# Patient Record
Sex: MALE | Race: WHITE | NOT HISPANIC OR LATINO | URBAN - METROPOLITAN AREA
[De-identification: names, ages, dates, MRNs, and addresses within clinical notes are randomized per-mention and may not be internally consistent; named-entity substitution may affect disease eponyms.]

---

## 2021-08-18 ENCOUNTER — WEB ENCOUNTER (OUTPATIENT)
Dept: URBAN - METROPOLITAN AREA CLINIC 94 | Facility: CLINIC | Age: 21
End: 2021-08-18

## 2021-08-18 ENCOUNTER — LAB OUTSIDE AN ENCOUNTER (OUTPATIENT)
Dept: URBAN - METROPOLITAN AREA CLINIC 94 | Facility: CLINIC | Age: 21
End: 2021-08-18

## 2021-08-18 ENCOUNTER — OFFICE VISIT (OUTPATIENT)
Dept: URBAN - METROPOLITAN AREA CLINIC 94 | Facility: CLINIC | Age: 21
End: 2021-08-18
Payer: COMMERCIAL

## 2021-08-18 VITALS
SYSTOLIC BLOOD PRESSURE: 115 MMHG | DIASTOLIC BLOOD PRESSURE: 73 MMHG | HEART RATE: 51 BPM | HEIGHT: 74 IN | TEMPERATURE: 98.4 F | WEIGHT: 194 LBS | BODY MASS INDEX: 24.9 KG/M2

## 2021-08-18 DIAGNOSIS — R94.5 ABNORMAL LFTS: ICD-10-CM

## 2021-08-18 DIAGNOSIS — R10.13 EPIGASTRIC PAIN: ICD-10-CM

## 2021-08-18 PROCEDURE — 99204 OFFICE O/P NEW MOD 45 MIN: CPT | Performed by: INTERNAL MEDICINE

## 2021-08-18 NOTE — HPI-TODAY'S VISIT:
21 yr male presents for evaluation of abdominal pain. Pt c/o epigastric abdominal pain wiht nausea for several months. Pain is dull and gets worse with food. Nausea and intermittent diarrhea. Pt evaluated by NP at [facility] 8/6/21. H Pylori breath test was negative. Labs including LFTs were WNL at that time. Celiac panel was negative. Pt subsequently evaluated at [facility] ER on 8/14/21 for abdominal pain after he drank unusually large amount of wine. Labs 8/14 showed  and ALT 88. Bilirubin and alkphos were WNL.  RUQ US was WNL. Pt was prescribed dicyclomine, prilosec and tramadol by ER physican.

## 2021-08-19 ENCOUNTER — LAB OUTSIDE AN ENCOUNTER (OUTPATIENT)
Dept: URBAN - METROPOLITAN AREA CLINIC 92 | Facility: CLINIC | Age: 21
End: 2021-08-19

## 2021-08-19 ENCOUNTER — TELEPHONE ENCOUNTER (OUTPATIENT)
Dept: URBAN - METROPOLITAN AREA CLINIC 92 | Facility: CLINIC | Age: 21
End: 2021-08-19

## 2021-08-19 PROBLEM — 166603001 LIVER FUNCTION TESTS ABNORMAL: Status: ACTIVE | Noted: 2021-08-19

## 2021-08-19 PROBLEM — 79922009 EPIGASTRIC PAIN: Status: ACTIVE | Noted: 2021-08-19

## 2021-08-19 LAB
A/G RATIO: 2.4
ALBUMIN: 4.7
ALKALINE PHOSPHATASE: 85
ALT (SGPT): 60
AST (SGOT): 60
BASO (ABSOLUTE): 0
BASOS: 0
BILIRUBIN, TOTAL: 2
BUN/CREATININE RATIO: 11
BUN: 10
CALCIUM: 9.1
CARBON DIOXIDE, TOTAL: 26
CHLORIDE: 102
CREATININE: 0.87
EGFR IF AFRICN AM: 142
EGFR IF NONAFRICN AM: 123
EOS (ABSOLUTE): 0.1
EOS: 3
GLOBULIN, TOTAL: 2
GLUCOSE: 89
HBSAG SCREEN: NEGATIVE
HEMATOCRIT: 47.4
HEMATOLOGY COMMENTS:: (no result)
HEMOGLOBIN: 15.8
HEP A AB, IGM: NEGATIVE
HEP B CORE AB, IGM: NEGATIVE
HEP C VIRUS AB: <0.1
IMMATURE CELLS: (no result)
IMMATURE GRANS (ABS): 0
IMMATURE GRANULOCYTES: 1
LIPASE: 14
LYMPHS (ABSOLUTE): 1.2
LYMPHS: 29
MCH: 29.7
MCHC: 33.3
MCV: 89
MONOCYTES(ABSOLUTE): 0.3
MONOCYTES: 7
NEUTROPHILS (ABSOLUTE): 2.5
NEUTROPHILS: 60
NRBC: (no result)
PLATELETS: 280
POTASSIUM: 4.5
PROTEIN, TOTAL: 6.7
RBC: 5.32
RDW: 12.2
SODIUM: 141
WBC: 4

## 2021-08-30 ENCOUNTER — WEB ENCOUNTER (OUTPATIENT)
Dept: URBAN - METROPOLITAN AREA CLINIC 94 | Facility: CLINIC | Age: 21
End: 2021-08-30

## 2021-09-13 ENCOUNTER — OFFICE VISIT (OUTPATIENT)
Dept: URBAN - METROPOLITAN AREA SURGERY CENTER 17 | Facility: SURGERY CENTER | Age: 21
End: 2021-09-13
Payer: COMMERCIAL

## 2021-09-13 ENCOUNTER — CLAIMS CREATED FROM THE CLAIM WINDOW (OUTPATIENT)
Dept: URBAN - METROPOLITAN AREA CLINIC 4 | Facility: CLINIC | Age: 21
End: 2021-09-13
Payer: COMMERCIAL

## 2021-09-13 DIAGNOSIS — K29.81 DUODENITIS WITH BLEEDING: ICD-10-CM

## 2021-09-13 DIAGNOSIS — R10.13 ABDOMINAL DISCOMFORT, EPIGASTRIC: ICD-10-CM

## 2021-09-13 DIAGNOSIS — K29.80 ACUTE DUODENITIS: ICD-10-CM

## 2021-09-13 DIAGNOSIS — K31.89 ACQUIRED DEFORMITY OF DUODENUM: ICD-10-CM

## 2021-09-13 DIAGNOSIS — K31.89 NONSURGICAL DUMPING SYNDROME: ICD-10-CM

## 2021-09-13 PROCEDURE — 88305 TISSUE EXAM BY PATHOLOGIST: CPT | Performed by: PATHOLOGY

## 2021-09-13 PROCEDURE — 43239 EGD BIOPSY SINGLE/MULTIPLE: CPT | Performed by: INTERNAL MEDICINE

## 2021-09-13 PROCEDURE — 88312 SPECIAL STAINS GROUP 1: CPT | Performed by: PATHOLOGY

## 2021-09-13 PROCEDURE — G8907 PT DOC NO EVENTS ON DISCHARG: HCPCS | Performed by: INTERNAL MEDICINE

## 2021-09-20 LAB
A/G RATIO: 2.2
ACTIN (SMOOTH MUSCLE) ANTIBODY: 7
ALBUMIN: 4.6
ALKALINE PHOSPHATASE: 76
ALPHA-1-ANTITRYPSIN, SERUM: 98
ALT (SGPT): 24
ANA DIRECT: NEGATIVE
AST (SGOT): 25
BILIRUBIN, TOTAL: 1.6
BUN/CREATININE RATIO: 11
BUN: 10
CALCIUM: 9.7
CARBON DIOXIDE, TOTAL: 27
CERULOPLASMIN: 18.7
CHLORIDE: 103
CREATININE: 0.92
EGFR IF AFRICN AM: 136
EGFR IF NONAFRICN AM: 118
GLOBULIN, TOTAL: 2.1
GLUCOSE: 96
IRON BIND.CAP.(TIBC): 318
IRON SATURATION: 42
IRON: 135
MITOCHONDRIAL (M2) ANTIBODY: <20
POTASSIUM: 4.3
PROTEIN, TOTAL: 6.7
SODIUM: 143
UIBC: 183

## 2021-09-21 ENCOUNTER — TELEPHONE ENCOUNTER (OUTPATIENT)
Dept: URBAN - METROPOLITAN AREA CLINIC 94 | Facility: CLINIC | Age: 21
End: 2021-09-21

## 2021-09-29 ENCOUNTER — OFFICE VISIT (OUTPATIENT)
Dept: URBAN - METROPOLITAN AREA CLINIC 94 | Facility: CLINIC | Age: 21
End: 2021-09-29

## 2021-10-27 ENCOUNTER — OFFICE VISIT (OUTPATIENT)
Dept: URBAN - METROPOLITAN AREA CLINIC 94 | Facility: CLINIC | Age: 21
End: 2021-10-27
Payer: COMMERCIAL

## 2021-10-27 ENCOUNTER — DASHBOARD ENCOUNTERS (OUTPATIENT)
Age: 21
End: 2021-10-27

## 2021-10-27 VITALS
TEMPERATURE: 97.7 F | SYSTOLIC BLOOD PRESSURE: 108 MMHG | HEART RATE: 79 BPM | WEIGHT: 202 LBS | BODY MASS INDEX: 25.93 KG/M2 | HEIGHT: 74 IN | DIASTOLIC BLOOD PRESSURE: 68 MMHG

## 2021-10-27 DIAGNOSIS — R10.13 DYSPEPSIA: ICD-10-CM

## 2021-10-27 PROCEDURE — 99213 OFFICE O/P EST LOW 20 MIN: CPT | Performed by: INTERNAL MEDICINE

## 2021-10-27 NOTE — HPI-TODAY'S VISIT:
21 yr male presents for followup of abdominal pain. Pt presented with epigastric abdominal pain with nausea for several months.  Pt evaluated by NP at [facility] 8/6/21. H Pylori breath test was negative. Labs including LFTs were WNL at that time. Celiac panel was negative. Pt subsequently evaluated at [facility] ER on 8/14/21 for abdominal pain after he drank unusually large amount of wine. Labs 8/14 showed  and ALT 88. Bilirubin and alkphos were WNL.  RUQ US was WNL. Pt was prescribed dicyclomine, prilosec and tramadol by ER physican. Repeat LFTs are WNL. Hepatitis serologies were negative. EGD showed mild duodenitis. Gastric biopsies normal without H Pylori. Pt states that his GI issues have resolved after making diet modifications. Denies any GI symptoms at this time.

## 2022-09-02 NOTE — PHYSICAL EXAM CARDIOVASCULAR:
no edema,  no murmurs,  regular rate and rhythm , no edema.
all other ROS negative except as per HPI
no rash